# Patient Record
Sex: MALE | URBAN - METROPOLITAN AREA
[De-identification: names, ages, dates, MRNs, and addresses within clinical notes are randomized per-mention and may not be internally consistent; named-entity substitution may affect disease eponyms.]

---

## 2017-03-16 ENCOUNTER — APPOINTMENT (OUTPATIENT)
Age: 22
Setting detail: DERMATOLOGY
End: 2017-03-16

## 2017-03-16 DIAGNOSIS — L30.0 NUMMULAR DERMATITIS: ICD-10-CM

## 2017-03-16 DIAGNOSIS — L21.8 OTHER SEBORRHEIC DERMATITIS: ICD-10-CM

## 2017-03-16 PROCEDURE — 99203 OFFICE O/P NEW LOW 30 MIN: CPT

## 2017-03-16 PROCEDURE — OTHER COUNSELING: OTHER

## 2017-03-16 PROCEDURE — OTHER TREATMENT REGIMEN: OTHER

## 2017-03-16 PROCEDURE — OTHER FOLLOW UP FOR NEXT VISIT: OTHER

## 2017-03-16 PROCEDURE — OTHER PRESCRIPTION: OTHER

## 2017-03-16 RX ORDER — PREDNISONE 10 MG/1
10 MG TABLET ORAL QAM
Qty: 50 | Refills: 0 | Status: ERX | COMMUNITY
Start: 2017-03-16 | End: 2017-03-27

## 2017-03-16 ASSESSMENT — LOCATION DETAILED DESCRIPTION DERM
LOCATION DETAILED: LEFT PROXIMAL PRETIBIAL REGION
LOCATION DETAILED: RIGHT RADIAL PALM
LOCATION DETAILED: RIGHT PROXIMAL PRETIBIAL REGION
LOCATION DETAILED: LEFT RADIAL PALM
LOCATION DETAILED: LEFT CENTRAL MALAR CHEEK
LOCATION DETAILED: PERIUMBILICAL SKIN

## 2017-03-16 ASSESSMENT — LOCATION ZONE DERM
LOCATION ZONE: LEG
LOCATION ZONE: HAND
LOCATION ZONE: FACE
LOCATION ZONE: TRUNK

## 2017-03-16 ASSESSMENT — LOCATION SIMPLE DESCRIPTION DERM
LOCATION SIMPLE: RIGHT PRETIBIAL REGION
LOCATION SIMPLE: LEFT HAND
LOCATION SIMPLE: RIGHT HAND
LOCATION SIMPLE: LEFT PRETIBIAL REGION
LOCATION SIMPLE: ABDOMEN
LOCATION SIMPLE: LEFT CHEEK

## 2017-03-16 ASSESSMENT — SEVERITY ASSESSMENT
SEVERITY: MODERATE
HOW SEVERE IS THIS PATIENT'S CONDITION?: MILD

## 2017-03-16 ASSESSMENT — BSA RASH: BSA RASH: 15

## 2017-03-16 NOTE — PROCEDURE: TREATMENT REGIMEN
Plan: Eczema is likely contributing to some degree however this appears to be most consistent with a seborrheic dermatitis
Detail Level: Simple
Otc Regimen: Dove unscented soap, Aveeno lotion twice daily, Aquaphor as needed at night
Samples Given: Extina foam bid. We can send in a generic topical wash or cream if this helps
Detail Level: Zone
Plan: This appears to be consistent with no nummular dermatitis with some component of dyshidrosis. There's likely to be an autoimmune component however it's difficult to say due to the distribution. Plan is to start him on a prednisone taper see him back in 7 to 10 days to see how it's working then to come up with a longer-term topical treatment option like Eucrisa or even to consider an oral methotrexate
Initiate Treatment: Prednisone
Otc Regimen: Dove unscented soap, Aveeno lotion, Aquaphor as directed

## 2017-03-24 ENCOUNTER — APPOINTMENT (OUTPATIENT)
Age: 22
Setting detail: DERMATOLOGY
End: 2017-03-25

## 2017-03-24 DIAGNOSIS — L21.8 OTHER SEBORRHEIC DERMATITIS: ICD-10-CM

## 2017-03-24 DIAGNOSIS — L30.0 NUMMULAR DERMATITIS: ICD-10-CM

## 2017-03-24 PROBLEM — L20.84 INTRINSIC (ALLERGIC) ECZEMA: Status: ACTIVE | Noted: 2017-03-24

## 2017-03-24 PROCEDURE — OTHER PRESCRIPTION: OTHER

## 2017-03-24 PROCEDURE — OTHER TREATMENT REGIMEN: OTHER

## 2017-03-24 PROCEDURE — OTHER COUNSELING: OTHER

## 2017-03-24 PROCEDURE — OTHER FOLLOW UP FOR NEXT VISIT: OTHER

## 2017-03-24 PROCEDURE — 99214 OFFICE O/P EST MOD 30 MIN: CPT

## 2017-03-24 RX ORDER — CLOBETASOL PROPIONATE 0.5 MG/G
THIN COAT OINTMENT TOPICAL BID
Qty: 1 | Refills: 2 | Status: ERX | COMMUNITY
Start: 2017-03-24

## 2017-03-24 RX ORDER — DESONIDE 0.5 MG/G
THIN COAT CREAM TOPICAL BID
Qty: 1 | Refills: 3 | Status: ERX | COMMUNITY
Start: 2017-03-24

## 2017-03-24 ASSESSMENT — LOCATION DETAILED DESCRIPTION DERM
LOCATION DETAILED: PERIUMBILICAL SKIN
LOCATION DETAILED: RIGHT PROXIMAL PRETIBIAL REGION
LOCATION DETAILED: RIGHT RADIAL PALM
LOCATION DETAILED: LEFT RADIAL PALM
LOCATION DETAILED: LEFT PROXIMAL PRETIBIAL REGION
LOCATION DETAILED: LEFT CENTRAL MALAR CHEEK

## 2017-03-24 ASSESSMENT — LOCATION ZONE DERM
LOCATION ZONE: HAND
LOCATION ZONE: FACE
LOCATION ZONE: LEG
LOCATION ZONE: TRUNK

## 2017-03-24 ASSESSMENT — BSA RASH: BSA RASH: 15

## 2017-03-24 ASSESSMENT — LOCATION SIMPLE DESCRIPTION DERM
LOCATION SIMPLE: LEFT HAND
LOCATION SIMPLE: LEFT PRETIBIAL REGION
LOCATION SIMPLE: RIGHT PRETIBIAL REGION
LOCATION SIMPLE: RIGHT HAND
LOCATION SIMPLE: ABDOMEN
LOCATION SIMPLE: LEFT CHEEK

## 2017-03-24 ASSESSMENT — SEVERITY ASSESSMENT: SEVERITY: MILD TO MODERATE

## 2017-03-24 NOTE — PROCEDURE: TREATMENT REGIMEN
Otc Regimen: Dove unscented soap, Aveeno lotion twice daily, Aquaphor as needed at night
Plan: The Extina foam was very irritating to the patient so we will change to a mild topical steroid to see if we can get this under control. Ultimately we will need to find a different long-term treatment option if he requires ongoing treatment
Initiate Treatment: Desonide cream
Detail Level: Simple
Plan: This appears to be consistent with no nummular dermatitis with some component of dyshidrosis. There's likely to be an autoimmune component however it's difficult to say due to the distribution. If needed we can consider other creative treatment options and even a biopsy in order to establish the diagnosis if the topical steroids do not work as well as planned
Detail Level: Zone
Otc Regimen: Dove unscented soap, Aveeno lotion, Aquaphor as directed
Initiate Treatment: Clobetasol ointment BID. Hopefully the topical medication will continue to improve and allow him to control this issue. We may need to consider other treatment options if this does not work as well as planned